# Patient Record
Sex: FEMALE | Race: WHITE | NOT HISPANIC OR LATINO | Employment: UNEMPLOYED | URBAN - METROPOLITAN AREA
[De-identification: names, ages, dates, MRNs, and addresses within clinical notes are randomized per-mention and may not be internally consistent; named-entity substitution may affect disease eponyms.]

---

## 2017-01-02 ENCOUNTER — THERAPY VISIT (OUTPATIENT)
Dept: PHYSICAL THERAPY | Facility: CLINIC | Age: 37
End: 2017-01-02
Payer: COMMERCIAL

## 2017-01-02 DIAGNOSIS — M25.561 ACUTE PAIN OF BOTH KNEES: Primary | ICD-10-CM

## 2017-01-02 DIAGNOSIS — M25.562 ACUTE PAIN OF BOTH KNEES: Primary | ICD-10-CM

## 2017-01-02 PROCEDURE — 97161 PT EVAL LOW COMPLEX 20 MIN: CPT | Mod: GP | Performed by: PHYSICAL THERAPIST

## 2017-01-02 PROCEDURE — 97110 THERAPEUTIC EXERCISES: CPT | Mod: GP | Performed by: PHYSICAL THERAPIST

## 2017-01-02 ASSESSMENT — ACTIVITIES OF DAILY LIVING (ADL)
KNEEL ON THE FRONT OF YOUR KNEE: ACTIVITY IS FAIRLY DIFFICULT
STAND: ACTIVITY IS NOT DIFFICULT
SIT WITH YOUR KNEE BENT: ACTIVITY IS VERY DIFFICULT
WALK: ACTIVITY IS NOT DIFFICULT
AS_A_RESULT_OF_YOUR_KNEE_INJURY,_HOW_WOULD_YOU_RATE_YOUR_CURRENT_LEVEL_OF_DAILY_ACTIVITY?: ABNORMAL
WEAKNESS: I DO NOT HAVE THE SYMPTOM
RAW_SCORE: 40
HOW_WOULD_YOU_RATE_THE_OVERALL_FUNCTION_OF_YOUR_KNEE_DURING_YOUR_USUAL_DAILY_ACTIVITIES?: ABNORMAL
LIMPING: THE SYMPTOM AFFECTS MY ACTIVITY SLIGHTLY
STIFFNESS: THE SYMPTOM AFFECTS MY ACTIVITY MODERATELY
KNEE_ACTIVITY_OF_DAILY_LIVING_SUM: 40
SQUAT: ACTIVITY IS FAIRLY DIFFICULT
GO DOWN STAIRS: ACTIVITY IS FAIRLY DIFFICULT
GIVING WAY, BUCKLING OR SHIFTING OF KNEE: THE SYMPTOM AFFECTS MY ACTIVITY SLIGHTLY
KNEE_ACTIVITY_OF_DAILY_LIVING_SCORE: 57.14
SWELLING: THE SYMPTOM AFFECTS MY ACTIVITY SLIGHTLY
PAIN: THE SYMPTOM AFFECTS MY ACTIVITY MODERATELY
HOW_WOULD_YOU_RATE_THE_CURRENT_FUNCTION_OF_YOUR_KNEE_DURING_YOUR_USUAL_DAILY_ACTIVITIES_ON_A_SCALE_FROM_0_TO_100_WITH_100_BEING_YOUR_LEVEL_OF_KNEE_FUNCTION_PRIOR_TO_YOUR_INJURY_AND_0_BEING_THE_INABILITY_TO_PERFORM_ANY_OF_YOUR_USUAL_DAILY_ACTIVITIES?: 50
GO UP STAIRS: ACTIVITY IS FAIRLY DIFFICULT
RISE FROM A CHAIR: ACTIVITY IS SOMEWHAT DIFFICULT

## 2017-01-02 NOTE — PROGRESS NOTES
[facility] Initial Evaluation    Subjective:    Jessica Dunne is a 36 year old female with a bilateral knees condition.  Condition occurred with:  Repetition/overuse.  Condition occurred: at home.  This is a new condition  Patient notes B anterior knee pain that came on with overuse, attempting to work out with squats/lunges/step cardio, after having had some increased knee pain while pregnant and with attempts to work out after.  Had her daughter on 8/1/16 and has noted pain since around that time. .    Site of Pain: anterior suprapatellar and medial/lateral patellar borders.    Pain is described as aching and is intermittent and reported as 7/10.  Associated with: crepitus, swelling suprapatellar area. Worse during: worse with certain activities, rather than dependent on time of day.  Exacerbated by: squatting, stairs, arising from sitting, sitting cross legged. and relieved by ice and bracing/immobilizing.  Since onset symptoms are gradually worsening.  Special testing: none.  Previous treatment: none for this.    General health as reported by patient is good.  Pertinent medical history includes:  Thyroid problems (previous history of LBP).  Medical allergies: yes (amoxycillin, doxycycline).  Surgical history: thyroid and appendix.  Current medications:  Thyroid medication (prenatal vitamins).  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        Barriers: childcare tasks.    Red flags:  None as reported by the patient.                      Objective:  KNEE:    PROM:   L  R   Hyperextension 3    Extension 0 0   Flexion 149 147     Strength:   L R   HIP     Flex 5 5   Ext 4+ 4+   Abd 4 5   KNEE     Flex 5 5   Ext 5 pain subpatellar 5 pain subpatellar     Special tests:   L R   Anterior Drawer negative negative   Posterior Drawer negative negative   Lachman's negative negative   Valgus 0 degrees negative negative   Valgus 30 degrees negative negative    Varus 0 degrees negative negative   Varus 30 degrees negative negative   Mirian's negative negative   Appley's negative negative   Lateral Compression     Patellar Compression         Palpation: note crepitus with long sitting knee extension B knees, some hypermobility B knees    Patellar tracking: note mild lateral glide and tilt B patella    Functional: double leg squat form is good.  Step down is with hip drop and mild knee valgus on B sides.     Gait: mildly antalgic  Did trial step down (2/10 pre) after Arredondo tape applied (R) for lateral tilt/glide, notes some pain reduction but tape tight so difficulty bending knee during step down, and dropped down last couple of inches, will wear over next few days to test over time    System    Physical Exam    General     ROS    Assessment/Plan:      Patient is a 36 year old female with both sides knee complaints.    Patient has the following significant findings with corresponding treatment plan.                Diagnosis 1:  B patellofemoral pain    Pain -  hot/cold therapy  Decreased ROM/flexibility - manual therapy and therapeutic exercise  Decreased strength - therapeutic exercise and therapeutic activities  Impaired balance - neuro re-education and therapeutic activities  Decreased proprioception - neuro re-education and therapeutic activities  Edema - cold therapy  Impaired gait - gait training  Decreased function - therapeutic activities    Therapy Evaluation Codes:   1) History comprised of:   Personal factors that impact the plan of care:      None.    Comorbidity factors that impact the plan of care are:      thyroid problems.     Medications impacting care: thyroid.  2) Examination of Body Systems comprised of:   Body structures and functions that impact the plan of care:      Lumbar spine.   Activity limitations that impact the plan of care are:      Running, Sitting, Sports, Squatting/kneeling and stairs.   Clinical presentation characteristics  are:    Evolving/Changing.  3) Presentation comprised of:   Presentation scored as Low complexity with uncomplicated characteristics..  4) Decision-Making    Low complexity using standardized patient assessment instrument and/or measureable assessment of functional outcome.  Cumulative Therapy Evaluation is: Low complexity.    Previous and current functional limitations:  (See Goal Flow Sheet for this information)    Short term and Long term goals: (See Goal Flow Sheet for this information)     Communication ability:  Patient appears to be able to clearly communicate and understand verbal and written communication and follow directions correctly.  Treatment Explanation - The following has been discussed with the patient:   RX ordered/plan of care  Anticipated outcomes  Possible risks and side effects  This patient would benefit from PT intervention to resume normal activities.   Rehab potential is good.    Frequency:  1 X week, once daily  Duration:  for 6 weeks  Discharge Plan:  Achieve all LTG.  Independent in home treatment program.  Reach maximal therapeutic benefit.    Please refer to the daily flowsheet for treatment today, total treatment time and time spent performing 1:1 timed codes.

## 2017-01-02 NOTE — Clinical Note
[facility] PHYSICAL THERAPY  [address]  [place] [address]  [phone]    January 3, 2017    Re: Jessica Dunne   :   1980  MRN:  [id number]   REFERRING PHYSICIAN:   Adrien Peralta    [facility] PHYSICAL [facility]    Date of Initial Evaluation: 2016  Visits:  Rxs Used: 1  Reason for Referral:  Acute pain of both knees    EVALUATION SUMMARY    Subjective:  Jessica Dunne is a 36 year old female with a bilateral knees condition.  Condition occurred with:  Repetition/overuse.  Condition occurred: at home.  This is a new condition  Patient notes B anterior knee pain that came on with overuse, attempting to work out with squats/lunges/step cardio, after having had some increased knee pain while pregnant and with attempts to work out after.  Had her daughter on 16 and has noted pain since around that time.   Site of Pain: anterior suprapatellar and medial/lateral patellar borders.    Pain is described as aching and is intermittent and reported as 7/10.  Associated with: crepitus, swelling suprapatellar area. Worse during: worse with certain activities, rather than dependent on time of day.  Exacerbated by: squatting, stairs, arising from sitting, sitting cross legged. and relieved by ice and bracing/immobilizing.  Since onset symptoms are gradually worsening.  Special testing: none.  Previous treatment: none for this.    General health as reported by patient is good.  Pertinent medical history includes:  Thyroid problems (previous history of LBP).  Medical allergies: yes (amoxycillin, doxycycline).  Surgical history: thyroid and appendix.  Current medications:  Thyroid medication (prenatal vitamins).  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      Barriers: childcare tasks.  Red flags:  None as reported by the patient.                   Objective:    KNEE:  PROM:   L  R   Hyperextension 3    Extension 0 0   Flexion 149 147     Re: Jessica Dunne   :   1980    Strength:   L R   HIP     Flex 5 5   Ext 4+ 4+   Abd 4 5   KNEE     Flex 5 5   Ext 5 pain subpatellar 5 pain subpatellar     Special tests:   L R   Anterior Drawer negative negative   Posterior Drawer negative negative   Lachman's negative negative   Valgus 0 degrees negative negative   Valgus 30 degrees negative negative   Varus 0 degrees negative negative   Varus 30 degrees negative negative   Mriian's negative negative   Appley's negative negative   Lateral Compression     Patellar Compression       Palpation: note crepitus with long sitting knee extension B knees, some hypermobility B knees  Patellar tracking: note mild lateral glide and tilt B patella  Functional: double leg squat form is good.  Step down is with hip drop and mild knee valgus on B sides.   Gait: mildly antalgic  Did trial step down (2/10 pre) after Arrdeondo tape applied (R) for lateral tilt/glide, notes some pain reduction but tape tight so difficulty bending knee during step down, and dropped down last couple of inches, will wear over next few days to test over time    Assessment/Plan:    Patient is a 36 year old female with both sides knee complaints.    Patient has the following significant findings with corresponding treatment plan.                Diagnosis 1:  B patellofemoral pain    Pain -  hot/cold therapy  Decreased ROM/flexibility - manual therapy and therapeutic exercise  Decreased strength - therapeutic exercise and therapeutic activities  Impaired balance - neuro re-education and therapeutic activities  Decreased proprioception - neuro re-education and therapeutic activities  Edema - cold therapy  Impaired gait - gait training  Decreased function - therapeutic activities    Re: Jessica Dunne   :   1980    Therapy Evaluation Codes:   1) History comprised of:   Personal factors that impact  the plan of care:      None.    Comorbidity factors that impact the plan of care are:      thyroid problems.     Medications impacting care: thyroid.  2) Examination of Body Systems comprised of:   Body structures and functions that impact the plan of care:      Lumbar spine.   Activity limitations that impact the plan of care are:      Running, Sitting, Sports, Squatting/kneeling and stairs.   Clinical presentation characteristics are:    Evolving/Changing.  3) Presentation comprised of:   Presentation scored as Low complexity with uncomplicated characteristics..  4) Decision-Making    Low complexity using standardized patient assessment instrument and/or measureable assessment of functional outcome.  Cumulative Therapy Evaluation is: Low complexity.    Previous and current functional limitations:  (See Goal Flow Sheet for this information)    Short term and Long term goals: (See Goal Flow Sheet for this information)     Communication ability:  Patient appears to be able to clearly communicate and understand verbal and written communication and follow directions correctly.  Treatment Explanation - The following has been discussed with the patient:   RX ordered/plan of care  Anticipated outcomes  Possible risks and side effects  This patient would benefit from PT intervention to resume normal activities.   Rehab potential is good.    Frequency:  1 X week, once daily  Duration:  for 6 weeks  Discharge Plan:  Achieve all LTG.  Independent in home treatment program.  Reach maximal therapeutic benefit.     Thank you for your referral.    INQUIRIES  Therapist: Christiano Knowles DPT  [facility]  [address] [address]  Phone: [phone]  Fax: [phone]

## 2017-01-10 ENCOUNTER — THERAPY VISIT (OUTPATIENT)
Dept: PHYSICAL THERAPY | Facility: CLINIC | Age: 37
End: 2017-01-10
Payer: COMMERCIAL

## 2017-01-10 DIAGNOSIS — M25.562 ACUTE PAIN OF BOTH KNEES: Primary | ICD-10-CM

## 2017-01-10 DIAGNOSIS — M25.561 ACUTE PAIN OF BOTH KNEES: Primary | ICD-10-CM

## 2017-01-10 PROCEDURE — 97110 THERAPEUTIC EXERCISES: CPT | Mod: GP | Performed by: PHYSICAL THERAPIST

## 2017-01-10 PROCEDURE — 97112 NEUROMUSCULAR REEDUCATION: CPT | Mod: GP | Performed by: PHYSICAL THERAPIST

## 2017-01-17 ENCOUNTER — THERAPY VISIT (OUTPATIENT)
Dept: PHYSICAL THERAPY | Facility: CLINIC | Age: 37
End: 2017-01-17
Payer: COMMERCIAL

## 2017-01-17 DIAGNOSIS — M25.561 ACUTE PAIN OF BOTH KNEES: Primary | ICD-10-CM

## 2017-01-17 DIAGNOSIS — M25.562 ACUTE PAIN OF BOTH KNEES: Primary | ICD-10-CM

## 2017-01-17 PROCEDURE — 97110 THERAPEUTIC EXERCISES: CPT | Mod: GP | Performed by: PHYSICAL THERAPIST

## 2017-01-17 PROCEDURE — 97112 NEUROMUSCULAR REEDUCATION: CPT | Mod: GP | Performed by: PHYSICAL THERAPIST

## 2017-01-24 ENCOUNTER — THERAPY VISIT (OUTPATIENT)
Dept: PHYSICAL THERAPY | Facility: CLINIC | Age: 37
End: 2017-01-24
Payer: COMMERCIAL

## 2017-01-24 DIAGNOSIS — M25.562 ACUTE PAIN OF BOTH KNEES: Primary | ICD-10-CM

## 2017-01-24 DIAGNOSIS — M25.561 ACUTE PAIN OF BOTH KNEES: Primary | ICD-10-CM

## 2017-01-24 PROCEDURE — 97110 THERAPEUTIC EXERCISES: CPT | Mod: GP | Performed by: PHYSICAL THERAPIST

## 2017-01-24 PROCEDURE — 97112 NEUROMUSCULAR REEDUCATION: CPT | Mod: GP | Performed by: PHYSICAL THERAPIST

## 2017-02-07 ENCOUNTER — THERAPY VISIT (OUTPATIENT)
Dept: PHYSICAL THERAPY | Facility: CLINIC | Age: 37
End: 2017-02-07
Payer: COMMERCIAL

## 2017-02-07 DIAGNOSIS — M25.561 ACUTE PAIN OF BOTH KNEES: Primary | ICD-10-CM

## 2017-02-07 DIAGNOSIS — M25.562 ACUTE PAIN OF BOTH KNEES: Primary | ICD-10-CM

## 2017-02-07 PROCEDURE — 97110 THERAPEUTIC EXERCISES: CPT | Mod: GP | Performed by: PHYSICAL THERAPIST

## 2017-02-07 PROCEDURE — 97112 NEUROMUSCULAR REEDUCATION: CPT | Mod: GP | Performed by: PHYSICAL THERAPIST

## 2017-02-21 ENCOUNTER — THERAPY VISIT (OUTPATIENT)
Dept: PHYSICAL THERAPY | Facility: CLINIC | Age: 37
End: 2017-02-21
Payer: COMMERCIAL

## 2017-02-21 DIAGNOSIS — M25.561 ACUTE PAIN OF BOTH KNEES: ICD-10-CM

## 2017-02-21 DIAGNOSIS — M25.562 ACUTE PAIN OF BOTH KNEES: ICD-10-CM

## 2017-02-21 PROCEDURE — 97112 NEUROMUSCULAR REEDUCATION: CPT | Mod: GP | Performed by: PHYSICAL THERAPIST

## 2017-02-21 PROCEDURE — 97110 THERAPEUTIC EXERCISES: CPT | Mod: GP | Performed by: PHYSICAL THERAPIST

## 2017-03-07 ENCOUNTER — THERAPY VISIT (OUTPATIENT)
Dept: PHYSICAL THERAPY | Facility: CLINIC | Age: 37
End: 2017-03-07
Payer: COMMERCIAL

## 2017-03-07 DIAGNOSIS — M25.561 ACUTE PAIN OF BOTH KNEES: ICD-10-CM

## 2017-03-07 DIAGNOSIS — M25.562 ACUTE PAIN OF BOTH KNEES: ICD-10-CM

## 2017-03-07 PROCEDURE — 97110 THERAPEUTIC EXERCISES: CPT | Mod: GP | Performed by: PHYSICAL THERAPIST

## 2017-03-07 PROCEDURE — 97112 NEUROMUSCULAR REEDUCATION: CPT | Mod: GP | Performed by: PHYSICAL THERAPIST

## 2017-03-07 ASSESSMENT — ACTIVITIES OF DAILY LIVING (ADL)
RISE FROM A CHAIR: ACTIVITY IS MINIMALLY DIFFICULT
KNEEL ON THE FRONT OF YOUR KNEE: ACTIVITY IS SOMEWHAT DIFFICULT
WALK: ACTIVITY IS NOT DIFFICULT
STIFFNESS: I DO NOT HAVE THE SYMPTOM
AS_A_RESULT_OF_YOUR_KNEE_INJURY,_HOW_WOULD_YOU_RATE_YOUR_CURRENT_LEVEL_OF_DAILY_ACTIVITY?: NEARLY NORMAL
HOW_WOULD_YOU_RATE_THE_OVERALL_FUNCTION_OF_YOUR_KNEE_DURING_YOUR_USUAL_DAILY_ACTIVITIES?: NEARLY NORMAL
GO UP STAIRS: ACTIVITY IS MINIMALLY DIFFICULT
PAIN: THE SYMPTOM AFFECTS MY ACTIVITY SLIGHTLY
GIVING WAY, BUCKLING OR SHIFTING OF KNEE: I DO NOT HAVE THE SYMPTOM
GO DOWN STAIRS: ACTIVITY IS MINIMALLY DIFFICULT
SIT WITH YOUR KNEE BENT: ACTIVITY IS MINIMALLY DIFFICULT
KNEE_ACTIVITY_OF_DAILY_LIVING_SUM: 60
RAW_SCORE: 60
LIMPING: I HAVE THE SYMPTOM BUT IT DOES NOT AFFECT MY ACTIVITY
HOW_WOULD_YOU_RATE_THE_CURRENT_FUNCTION_OF_YOUR_KNEE_DURING_YOUR_USUAL_DAILY_ACTIVITIES_ON_A_SCALE_FROM_0_TO_100_WITH_100_BEING_YOUR_LEVEL_OF_KNEE_FUNCTION_PRIOR_TO_YOUR_INJURY_AND_0_BEING_THE_INABILITY_TO_PERFORM_ANY_OF_YOUR_USUAL_DAILY_ACTIVITIES?: 85
WEAKNESS: I DO NOT HAVE THE SYMPTOM
SQUAT: ACTIVITY IS MINIMALLY DIFFICULT
STAND: ACTIVITY IS NOT DIFFICULT
SWELLING: I DO NOT HAVE THE SYMPTOM
KNEE_ACTIVITY_OF_DAILY_LIVING_SCORE: 85.71

## 2017-03-07 NOTE — MR AVS SNAPSHOT
"              After Visit Summary   3/7/2017    Jessica Dunne    MRN: [id number]           Patient Information     Date Of Birth          1980        Visit Information        Provider Department      3/7/2017 6:20 PM Josiah Knowles PT [facility]        Today's Diagnoses     Acute pain of both knees           Follow-ups after your visit        Who to contact     If you have questions or need follow up information about today's clinic visit or your schedule please contact [facility] PHYSICAL [facility] directly at [phone].  Normal or non-critical lab and imaging results will be communicated to you by Xplornethart, letter or phone within 4 business days after the clinic has received the results. If you do not hear from us within 7 days, please contact the clinic through Xplornethart or phone. If you have a critical or abnormal lab result, we will notify you by phone as soon as possible.  Submit refill requests through GetTaxi or call your pharmacy and they will forward the refill request to us. Please allow 3 business days for your refill to be completed.          Additional Information About Your Visit        MyChart Information     GetTaxi lets you send messages to your doctor, view your test results, renew your prescriptions, schedule appointments and more. To sign up, go to [email or web address] . Click on \"Log in\" on the left side of the screen, which will take you to the Welcome page. Then click on \"Sign up Now\" on the right side of the page.     You will be asked to enter the access code listed below, as well as some personal information. Please follow the directions to create your username and password.     Your access code is: [email or web address]  Expires: 2017  7:21 PM     Your access code will  in 90 days. If you need help or a new code, please call your [place] clinic or [phone].        Care EveryWhere ID     " This is your Care EveryWhere ID. This could be used by other organizations to access your [facility] medical records  [id number]         Blood Pressure from Last 3 Encounters:   No data found for BP    Weight from Last 3 Encounters:   No data found for Wt              We Performed the Following     [facility] PROGRESS NOTES REPORT     NEUROMUSCULAR RE-EDUCATION     THERAPEUTIC EXERCISES        Primary Care Provider    None Specified       No primary provider on file.        Thank you!     Thank you for choosing [facility]ERAP[facility]  for your care. Our goal is always to provide you with excellent care. Hearing back from our patients is one way we can continue to improve our services. Please take a few minutes to complete the written survey that you may receive in the mail after your visit with us. Thank you!             Your Updated Medication List - Protect others around you: Learn how to safely use, store and throw away your medicines at www.[email or web address].org.      Notice  As of 3/7/2017  7:34 PM    You have not been prescribed any medications.

## 2017-03-07 NOTE — LETTER
[facility]LET[facility] PHYSICAL THERAPY  [address]  [place] [address]  [phone]    2017    Re: Jessica Dunne   :   1980  MRN:  [id number]   REFERRING PHYSICIAN:   Adrien Peralta    [facility] PHYSICAL [facility]    Date of Initial Evaluation:  2017  Visits:  Rxs Used: 7  Reason for Referral:  Acute pain of both knees    DISCHARGE REPORT  Progress reporting period is from 17 to 3/7/17 (7 visits).       SUBJECTIVE  Subjective changes noted by patient:  Patient reports that she feels she is improving well now.  She notes that she can now go down stairs without pain most of the time.  She now has just mild/intermittent L lateral ITB type pain and R subpatellar area pain, but notes both are improved.  She has intermittent pain getting up off of the floor (while holding her small child), but this is better with good technique as well.  The L lateral knee/ITB pain was waking her at night 3-4x, but that is down to 1 now.  She also noted some diastisis recti after pregnancy and feels core strength has helped some with that.     Current pain level is: 1/10.     Previous pain level was: 7/10.   Changes in function:  Yes (See Goal flowsheet attached for changes in current functional level)  Adverse reaction to treatment or activity: None    OBJECTIVE  Changes noted in objective findings:  Yes,   Objective: Not painful on stair reciprocation today.  Strength improved and is 5/5 for hip flexion, abduction, knee flexion and extension bilaterally.  Hip extension is 5-/5 on L and 5/5 on R.  Plank time up to 1 min.  Overall, she has made good gains.      ASSESSMENT/PLAN  Updated problem list and treatment plan: Diagnosis 1:  B patellofemoral pain    Pain -  self management  Decreased strength - home program  Decreased proprioception - home program  Decreased function - home program  STG/LTGs have been met or  progress has been made towards goals:  Yes (See Goal flow sheet completed today.)  Assessment of Progress: The patient's condition is improving.  Re: Jessica Dunne   :   1980    Self Management Plans:  Patient has been instructed in a home treatment program.  I have re-evaluated this patient and find that the nature, scope, duration and intensity of the therapy is appropriate for the medical condition of the patient.  Jessica continues to require the following intervention to meet STG and LTG's:  PT intervention is no longer required to meet STG/LTG.    Recommendations:  Will trial self management with HEP at this point.  May call PT if she feels symptoms worsen.  If no follow up in 3-4 weeks, will discharge from PT.    Thank you for your referral.    INQUIRIES  Therapist: Christiano Knowles DPT  [facility] [place] [facility]  [address] [address]  Phone: [phone]  Fax: [phone]

## 2017-03-08 NOTE — PROGRESS NOTES
Subjective:    HPI                    Objective:    System    Physical Exam    General     ROS    Assessment/Plan:      DISCHARGE REPORT    Progress reporting period is from 1/2/17 to 3/7/17 (7 visits).       SUBJECTIVE  Subjective changes noted by patient:  Patient reports that she feels she is improving well now.  She notes that she can now go down stairs without pain most of the time.  She now has just mild/intermittent L lateral ITB type pain and R subpatellar area pain, but notes both are improved.  She has intermittent pain getting up off of the floor (while holding her small child), but this is better with good technique as well.  The L lateral knee/ITB pain was waking her at night 3-4x, but that is down to 1 now.  She also noted some diastisis recti after pregnancy and feels core strength has helped some with that.     Current pain level is: 1/10.     Previous pain level was: 7/10.   Changes in function:  Yes (See Goal flowsheet attached for changes in current functional level)  Adverse reaction to treatment or activity: None    OBJECTIVE  Changes noted in objective findings:  Yes,   Objective: Not painful on stair reciprocation today.  Strength improved and is 5/5 for hip flexion, abduction, knee flexion and extension bilaterally.  Hip extension is 5-/5 on L and 5/5 on R.  Plank time up to 1 min.  Overall, she has made good gains.      ASSESSMENT/PLAN  Updated problem list and treatment plan: Diagnosis 1:  B patellofemoral pain    Pain -  self management  Decreased strength - home program  Decreased proprioception - home program  Decreased function - home program  STG/LTGs have been met or progress has been made towards goals:  Yes (See Goal flow sheet completed today.)  Assessment of Progress: The patient's condition is improving.  Self Management Plans:  Patient has been instructed in a home treatment program.  I have re-evaluated this patient and find that the nature, scope, duration and intensity of the  therapy is appropriate for the medical condition of the patient.  Jessica continues to require the following intervention to meet STG and LTG's:  PT intervention is no longer required to meet STG/LTG.    Recommendations:  Will trial self management with HEP at this point.  May call PT if she feels symptoms worsen.  If no follow up in 3-4 weeks, will discharge from PT.    Please refer to the daily flowsheet for treatment today, total treatment time and time spent performing 1:1 timed codes.

## 2018-12-05 ENCOUNTER — THERAPY VISIT (OUTPATIENT)
Dept: PHYSICAL THERAPY | Facility: CLINIC | Age: 38
End: 2018-12-05
Payer: COMMERCIAL

## 2018-12-05 DIAGNOSIS — M54.50 ACUTE BILATERAL LOW BACK PAIN WITHOUT SCIATICA: Primary | ICD-10-CM

## 2018-12-05 PROCEDURE — 97110 THERAPEUTIC EXERCISES: CPT | Mod: GP | Performed by: PHYSICAL THERAPIST

## 2018-12-05 PROCEDURE — 97530 THERAPEUTIC ACTIVITIES: CPT | Mod: GP | Performed by: PHYSICAL THERAPIST

## 2018-12-05 PROCEDURE — 97161 PT EVAL LOW COMPLEX 20 MIN: CPT | Mod: GP | Performed by: PHYSICAL THERAPIST

## 2018-12-05 NOTE — PROGRESS NOTES
[facility] Initial Evaluation  Subjective:  Patient is a 38 year old female presenting with rehab back hpi. The history is provided by the patient. No  was used.   Jessica Dunne is a 38 year old female with a lumbar condition.  Condition occurred with:  Insidious onset.  Condition occurred: for unknown reasons.  This is a new condition  On 2018, I started to have low back pain right greater than left and right leg pain.  The pain has now gone from right side to the middle and is not shooting down the leg but it is still there in the low back.  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    Patient reports pain:  Lumbar spine left, lumbar spine right and lower lumbar spine (right greater than left).  Radiates to:  No radiation.  Pain is described as sharp and is intermittent and reported as 4/10 and 2/10 (ranges).  Associated with: none. Pain is the same all the time.  Symptoms are exacerbated by lifting, bending and carrying and relieved by heat and ice.  Since onset symptoms are gradually improving.  Special testing: none.  Previous treatment includes physical therapy.  There was significant improvement following previous treatment.  General health as reported by patient is good.  Pertinent medical history includes:  Thyroid problems (diastasis recti).  Medical allergies: yes (amixycillin, doxycycline).  Surgical history: , appendex, thyroid lobectomy.  Current medications:  Thyroid medication.  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  Patient is working in normal job with restrictions.  Primary job tasks include:  Lifting, prolonged sitting and prolonged standing.    Barriers include:  Bathroom/bedroom on second floor (house, basement laundry.).    Red flags:  None as reported by the patient.                        Objective:  Standing Alignment:    Cervical/Thoracic:  Forward head  Shoulder/UE:  Rounded shoulders  Lumbar:  Lordosis decr  Pelvic:  Iliac  crest high R  Hip:  Greater trochanter high R        Gait:      Deviations:  General Deviations:  Stride length decr and caro decr    Flexibility/Screens:   Positive screens:  Lumbar    Lower Extremity:  Decreased left lower extremity flexibility:Hip Flexors; Quadriceps; Hamstrings and Gastroc    Decreased right lower extremity flexibility:  Hip Flexors and Quadriceps  Spine:  Decreased left spine flexibility:  Quadratus Lumborum    Decreased right spine flexibility:  Quadratus Lumborum             Lumbar/SI Evaluation  ROM:    AROM Lumbar:   Flexion:        Ankle with pain on return and increase in pain   Ext:                    Moderate with reps decrease in pain    Side Bend:        Left:     Right:   Rotation:           Left:     Right:   Side Glide:        Left:     Right:         Strength: TA 1+/5, decrease in hip extension,   Lumbar Myotomes:    T12-L3 (Hip Flex):  Left: 5    Right: 5  L2-4 (Quads):  Left:  5    Right:  5  L4 (Ankle DF):  Left:  5    Right:  5  L5 (Great Toe Ext): Left: 5    Right: 5   S1 (Toe Raise):  Left: 5    Right: 5        Neural Tension/Mobility:      Left side:Slump  negative.   Right side:   Slump positive.  Lumbar Palpation:    Tenderness present at Left:    Quadratus Lumborum; Erector Spinae and Vertebral  Tenderness present at Right: Quadratus Lumborum and Vertebral    Lumbar Provocation:    Left positive with:  Mobility  Right positive with: Mobility  Spinal Segmental Conclusions:     Level: Hypo noted at T11, T12, L1 and L5                                                   General     ROS    Assessment/Plan:    Patient is a 38 year old female with lumbar complaints.    Patient has the following significant findings with corresponding treatment plan.                Diagnosis 1:  Low  Back pain   Pain -  manual therapy, self management, education, directional preference exercise and home program  Decreased ROM/flexibility - manual therapy, therapeutic exercise, therapeutic  activity and home program  Decreased joint mobility - manual therapy, therapeutic exercise, therapeutic activity and home program  Impaired gait - gait training and home program  Impaired muscle performance - neuro re-education and home program  Decreased function - therapeutic activities and home program    Therapy Evaluation Codes:   1) History comprised of:   Personal factors that impact the plan of care:      Past/current experiences and stay at home mom.    Comorbidity factors that impact the plan of care are:      .     Medications impacting care: None.  2) Examination of Body Systems comprised of:   Body structures and functions that impact the plan of care:      Lumbar spine.   Activity limitations that impact the plan of care are:      Bathing, Bending, Cooking, Driving, Dressing, Lifting, Sitting, Squatting/kneeling, Stairs, Working, Sleeping and  duties.  3) Clinical presentation characteristics are:   Stable/Uncomplicated.  4) Decision-Making    Low complexity using standardized patient assessment instrument and/or measureable assessment of functional outcome.  Cumulative Therapy Evaluation is: Low complexity.    Previous and current functional limitations:  (See Goal Flow Sheet for this information)    Short term and Long term goals: (See Goal Flow Sheet for this information)     Communication ability:  Patient appears to be able to clearly communicate and understand verbal and written communication and follow directions correctly.  Treatment Explanation - The following has been discussed with the patient:   RX ordered/plan of care  Possible risks and side effects  This patient would benefit from PT intervention to resume normal activities.   Rehab potential is good.    Frequency:  2 X week, once daily  Duration:  for 1 weeks tapering to 1 X a week over 4 weeks  Discharge Plan:  Achieve all LTG.  Independent in home treatment program.    Please refer to the daily flowsheet for  treatment today, total treatment time and time spent performing 1:1 timed codes.

## 2018-12-05 NOTE — MR AVS SNAPSHOT
After Visit Summary   12/5/2018    Jessica Dunne    MRN: [id number]           Patient Information     Date Of Birth          1980        Visit Information        Provider Department      12/5/2018 4:50 PM Akanksha Millan, PT [facility] Physical [facility]        Today's Diagnoses     Acute bilateral low back pain without sciatica    -  1       Follow-ups after your visit        Your next 10 appointments already scheduled     Dec 10, 2018  6:10 PM CST   JAYSHREE Spine with Leah Ansari PT   [facility] ([facility])    [address] [place] [address] [address] [place] [id number]   [phone]            Dec 13, 2018  5:30 PM CST   JAYSHREE Spine with Akanksha Millan PT   [facility] ([facility])    [address] [place] [id number]   [phone]            Dec 17, 2018  6:10 PM CST   JAYSHREE Spine with Leah Ansari PT   [facility] ([facility])    [address] [place] [address]   [phone]            Dec 24, 2018  9:00 AM CST   JAYSHREE Spine with Akanksha Millan PT   [facility] ([facility])    [address] [place] [address]   [phone]              Who to contact     If you have questions or need follow up information about today's clinic visit or your schedule please contact [facility]ERAP[facility] directly at [phone].  Normal or non-critical lab and imaging results will be communicated to you by MyChart, letter or phone within 4 business days after the clinic has received the results. If you do not hear from us within 7 days, please contact the clinic through MyChart or phone. If you  "have a critical or abnormal lab result, we will notify you by phone as soon as possible.  Submit refill requests through [employer] or call your pharmacy and they will forward the refill request to us. Please allow 3 business days for your refill to be completed.          Additional Information About Your Visit        [employer] Information     [employer] lets you send messages to your doctor, view your test results, renew your prescriptions, schedule appointments and more. To sign up, go to [email or web address] . Click on \"Log in\" on the left side of the screen, which will take you to the Welcome page. Then click on \"Sign up Now\" on the right side of the page.     You will be asked to enter the access code listed below, as well as some personal information. Please follow the directions to create your username and password.     Your access code is: [email or web address]  Expires: 3/5/2019  7:43 PM     Your access code will  in 90 days. If you need help or a new code, please call your [place] clinic or [phone].        Care EveryWhere ID     This is your Care EveryWhere ID. This could be used by other organizations to access your [place] medical records  [id number]         Blood Pressure from Last 3 Encounters:   No data found for BP    Weight from Last 3 Encounters:   No data found for Wt              We Performed the Following     HC PT EVAL, LOW COMPLEXITY     JAYSHREE INITIAL EVAL REPORT     THERAPEUTIC ACTIVITIES     THERAPEUTIC EXERCISES        Primary Care Provider    None Specified       No primary provider on file.        Equal Access to Services     [facility]: Hadii adan Coley, waaxda luelizabethadaha, qaybta kaalmada leonid, dejah childs . So [facility] [phone].    ATENCIÓN: Si habla español, tiene a kramer disposición servicios gratuitos de asistencia lingüística. Llame al [phone].    We comply with applicable federal civil rights laws and [place] laws. We do not " discriminate on the basis of race, color, national origin, age, disability, sex, sexual orientation, or gender identity.            Thank you!     Thank you for choosing [facility]ERAP[facility]  for your care. Our goal is always to provide you with excellent care. Hearing back from our patients is one way we can continue to improve our services. Please take a few minutes to complete the written survey that you may receive in the mail after your visit with us. Thank you!             Your Updated Medication List - Protect others around you: Learn how to safely use, store and throw away your medicines at www.disposemymeds.org.      Notice  As of 12/5/2018  7:43 PM    You have not been prescribed any medications.

## 2018-12-05 NOTE — LETTER
[facility]  [address]  [facility] [address]  [phone]    2018    Re: Jessica Dunne   :   1980  MRN:  [id number]   REFERRING PHYSICIAN:   Referred Self    [facility] PHYSICAL [facility]    Date of Initial Evaluation:  2018  Visits:  Rxs Used: 1  Reason for Referral:  Acute bilateral low back pain without sciatica    EVALUATION SUMMARY    Subjective:  Patient is a 38 year old female presenting with rehab back hpi. The history is provided by the patient. No  was used.   Jessica Dunne is a 38 year old female with a lumbar condition.  Condition occurred with:  Insidious onset.  Condition occurred: for unknown reasons.  This is a new condition  On 2018, I started to have low back pain right greater than left and right leg pain.  The pain has now gone from right side to the middle and is not shooting down the leg but it is still there in the low back.  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    Patient reports pain:  Lumbar spine left, lumbar spine right and lower lumbar spine (right greater than left).  Radiates to:  No radiation.  Pain is described as sharp and is intermittent and reported as 4/10 and 2/10 (ranges).  Associated with: none. Pain is the same all the time.  Symptoms are exacerbated by lifting, bending and carrying and relieved by heat and ice.  Since onset symptoms are gradually improving.  Special testing: none.  Previous treatment includes physical therapy.  There was significant improvement following previous treatment.  General health as reported by patient is good.  Pertinent medical history includes:  Thyroid problems (diastasis recti).  Medical allergies: yes (amixycillin, doxycycline).  Surgical history: , appendex, thyroid lobectomy.  Current medications:  Thyroid medication.  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  Patient is working in normal job with restrictions.  Primary job tasks include:  Lifting, prolonged sitting and prolonged standing.  Barriers include:  Bathroom/bedroom on second floor (house, basement laundry.).  Red flags:  None as reported by the patient.                  Objective:  Standing Alignment:    Cervical/Thoracic:  Forward head  Shoulder/UE:  Rounded shoulders  Lumbar:  Lordosis decr  Pelvic:  Iliac crest high R  Hip:  Greater trochanter high R  Gait:    Deviations:  General Deviations:  Stride length decr and caro decr  Flexibility/Screens:   Re: Jessica Dunne   :   1980    Positive screens:  Lumbar  Lower Extremity:  Decreased left lower extremity flexibility:Hip Flexors; Quadriceps; Hamstrings and Gastroc  Decreased right lower extremity flexibility:  Hip Flexors and Quadriceps  Spine:  Decreased left spine flexibility:  Quadratus Lumborum  Decreased right spine flexibility:  Quadratus Lumborum       Lumbar/SI Evaluation  ROM:    AROM Lumbar:   Flexion:        Ankle with pain on return and increase in pain   Ext:                    Moderate with reps decrease in pain    Side Bend:        Left:     Right:   Rotation:           Left:     Right:   Side Glide:        Left:     Right:   Strength: TA 1+/5, decrease in hip extension,   Lumbar Myotomes:    T12-L3 (Hip Flex):  Left: 5    Right: 5  L2-4 (Quads):  Left:  5    Right:  5  L4 (Ankle DF):  Left:  5    Right:  5  L5 (Great Toe Ext): Left: 5    Right: 5   S1 (Toe Raise):  Left: 5    Right: 5  Neural Tension/Mobility:    Left side:Slump  negative.   Right side:   Slump positive.  Lumbar Palpation:    Tenderness present at Left:    Quadratus Lumborum; Erector Spinae and Vertebral  Tenderness present at Right: Quadratus Lumborum and Vertebral  Lumbar Provocation:    Left positive with:  Mobility  Right positive with: Mobility  Spinal Segmental Conclusions:   Level: Hypo noted at T11, T12, L1 and L5    Assessment/Plan:    Patient is  a 38 year old female with lumbar complaints.    Patient has the following significant findings with corresponding treatment plan.                Diagnosis 1:  Low  Back pain   Pain -  manual therapy, self management, education, directional preference exercise and home program  Decreased ROM/flexibility - manual therapy, therapeutic exercise, therapeutic activity and home program  Decreased joint mobility - manual therapy, therapeutic exercise, therapeutic activity and home program  Impaired gait - gait training and home program  Impaired muscle performance - neuro re-education and home program  Decreased function - therapeutic activities and home program    Therapy Evaluation Codes:   1) History comprised of:  Re: Jessica Dunne   :   1980     Personal factors that impact the plan of care:      Past/current experiences and stay at 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    Comorbidity factors that impact the plan of care are:      .     Medications impacting care: None.  2) Examination of Body Systems comprised of:   Body structures and functions that impact the plan of care:      Lumbar spine.   Activity limitations that impact the plan of care are:      Bathing, Bending, Cooking, Driving, Dressing, Lifting, Sitting, Squatting/kneeling, Stairs, Working, Sleeping and  duties.  3) Clinical presentation characteristics are:   Stable/Uncomplicated.  4) Decision-Making    Low complexity using standardized patient assessment instrument and/or measureable assessment of functional outcome.  Cumulative Therapy Evaluation is: Low complexity.    Previous and current functional limitations:  (See Goal Flow Sheet for this information)    Short term and Long term goals: (See Goal Flow Sheet for this information)     Communication ability:  Patient appears to be able to clearly communicate and understand verbal and written communication and follow directions correctly.  Treatment Explanation - The following has been discussed with the  patient:   RX ordered/plan of care  Possible risks and side effects  This patient would benefit from PT intervention to resume normal activities.   Rehab potential is good.    Frequency:  2 X week, once daily  Duration:  for 1 weeks tapering to 1 X a week over 4 weeks  Discharge Plan:  Achieve all LTG.  Independent in home treatment program.    Thank you for your referral.    INQUIRIES  Therapist: Akanksha Millan, PT  [facility]  [address] [address]  Phone: [phone]  Fax: [phone]

## 2018-12-13 ENCOUNTER — THERAPY VISIT (OUTPATIENT)
Dept: PHYSICAL THERAPY | Facility: CLINIC | Age: 38
End: 2018-12-13
Payer: COMMERCIAL

## 2018-12-13 DIAGNOSIS — M54.50 ACUTE BILATERAL LOW BACK PAIN WITHOUT SCIATICA: ICD-10-CM

## 2018-12-13 PROCEDURE — 97110 THERAPEUTIC EXERCISES: CPT | Mod: GP | Performed by: PHYSICAL THERAPIST

## 2018-12-13 PROCEDURE — 97112 NEUROMUSCULAR REEDUCATION: CPT | Mod: GP | Performed by: PHYSICAL THERAPIST

## 2018-12-17 ENCOUNTER — THERAPY VISIT (OUTPATIENT)
Dept: PHYSICAL THERAPY | Facility: CLINIC | Age: 38
End: 2018-12-17
Payer: COMMERCIAL

## 2018-12-17 DIAGNOSIS — M54.50 ACUTE BILATERAL LOW BACK PAIN WITHOUT SCIATICA: ICD-10-CM

## 2018-12-17 PROCEDURE — 97110 THERAPEUTIC EXERCISES: CPT | Mod: GP | Performed by: PHYSICAL THERAPIST

## 2018-12-17 PROCEDURE — 97112 NEUROMUSCULAR REEDUCATION: CPT | Mod: GP | Performed by: PHYSICAL THERAPIST

## 2018-12-24 ENCOUNTER — THERAPY VISIT (OUTPATIENT)
Dept: PHYSICAL THERAPY | Facility: CLINIC | Age: 38
End: 2018-12-24
Payer: COMMERCIAL

## 2018-12-24 DIAGNOSIS — M54.50 ACUTE BILATERAL LOW BACK PAIN WITHOUT SCIATICA: ICD-10-CM

## 2018-12-24 PROCEDURE — 97110 THERAPEUTIC EXERCISES: CPT | Mod: GP | Performed by: PHYSICAL THERAPIST

## 2018-12-24 PROCEDURE — 97530 THERAPEUTIC ACTIVITIES: CPT | Mod: GP | Performed by: PHYSICAL THERAPIST

## 2018-12-24 PROCEDURE — 97112 NEUROMUSCULAR REEDUCATION: CPT | Mod: GP | Performed by: PHYSICAL THERAPIST

## 2019-03-25 PROBLEM — M54.50 ACUTE BILATERAL LOW BACK PAIN WITHOUT SCIATICA: Status: RESOLVED | Noted: 2018-12-05 | Resolved: 2019-03-25

## 2019-03-25 NOTE — PROGRESS NOTES
Discharge Note    Progress reporting period is from initial evaluation date (please see noted date below) to Dec 24, 2018.  Linked Episodes   Type: Episode: Status: Noted: Resolved: Last update: Updated by:   PHYSICAL THERAPY low back pain 12/5/2018 Active 12/5/2018 12/24/2018  9:11 AM Akanksha Millan PT      Comments:       Jessica failed to follow up and current status is unknown.  Please see information below for last relevant information on current status.  Patient seen for 4 visits.    SUBJECTIVE  Subjective changes noted by patient:  I am sleeping a little better, able to sleep through the night   .  Current pain level is 0/10.     Previous pain level was   .   Changes in function:  Yes (See Goal flowsheet attached for changes in current functional level)  Adverse reaction to treatment or activity: None    OBJECTIVE  Changes noted in objective findings: TROM flexion ankle, slight pain with return, extension WFL TA 2-/5,  instruction in basic body mechaninics with lifting from chair      ASSESSMENT/PLAN  Diagnosis: low back pain    Updated problem list and treatment plan:   Decreased strength - HEP  Impaired muscle performance - HEP  STG/LTGs have been met or progress has been made towards goals:  Yes, please see goal flowsheet for most current information  Assessment of Progress: current status is unknown.    Last current status:     Self Management Plans:  HEP  I have re-evaluated this patient and find that the nature, scope, duration and intensity of the therapy is appropriate for the medical condition of the patient.  Jessica continues to require the following intervention to meet STG and LTG's:  HEP.    Recommendations:  Discharge with current home program.  Patient to follow up with MD as needed.    Please refer to the daily flowsheet for treatment today, total treatment time and time spent performing 1:1 timed codes.

## 2021-07-20 ENCOUNTER — APPOINTMENT (OUTPATIENT)
Dept: URBAN - METROPOLITAN AREA CLINIC 254 | Age: 41
Setting detail: DERMATOLOGY
End: 2021-07-20

## 2021-07-20 VITALS — HEIGHT: 69 IN | WEIGHT: 143 LBS | RESPIRATION RATE: 16 BRPM

## 2021-07-20 DIAGNOSIS — D22 MELANOCYTIC NEVI: ICD-10-CM

## 2021-07-20 DIAGNOSIS — D36.1 BENIGN NEOPLASM OF PERIPHERAL NERVES AND AUTONOMIC NERVOUS SYSTEM: ICD-10-CM

## 2021-07-20 DIAGNOSIS — D18.0 HEMANGIOMA: ICD-10-CM

## 2021-07-20 DIAGNOSIS — L81.4 OTHER MELANIN HYPERPIGMENTATION: ICD-10-CM

## 2021-07-20 DIAGNOSIS — Z71.89 OTHER SPECIFIED COUNSELING: ICD-10-CM

## 2021-07-20 DIAGNOSIS — L82.1 OTHER SEBORRHEIC KERATOSIS: ICD-10-CM

## 2021-07-20 DIAGNOSIS — L73.8 OTHER SPECIFIED FOLLICULAR DISORDERS: ICD-10-CM

## 2021-07-20 PROBLEM — D48.5 NEOPLASM OF UNCERTAIN BEHAVIOR OF SKIN: Status: ACTIVE | Noted: 2021-07-20

## 2021-07-20 PROBLEM — D23.72 OTHER BENIGN NEOPLASM OF SKIN OF LEFT LOWER LIMB, INCLUDING HIP: Status: ACTIVE | Noted: 2021-07-20

## 2021-07-20 PROBLEM — D22.62 MELANOCYTIC NEVI OF LEFT UPPER LIMB, INCLUDING SHOULDER: Status: ACTIVE | Noted: 2021-07-20

## 2021-07-20 PROBLEM — D22.72 MELANOCYTIC NEVI OF LEFT LOWER LIMB, INCLUDING HIP: Status: ACTIVE | Noted: 2021-07-20

## 2021-07-20 PROBLEM — D22.5 MELANOCYTIC NEVI OF TRUNK: Status: ACTIVE | Noted: 2021-07-20

## 2021-07-20 PROBLEM — D18.01 HEMANGIOMA OF SKIN AND SUBCUTANEOUS TISSUE: Status: ACTIVE | Noted: 2021-07-20

## 2021-07-20 PROBLEM — D36.12 BENIGN NEOPLASM OF PERIPHERAL NERVES AND AUTONOMIC NERVOUS SYSTEM, UPPER LIMB, INCLUDING SHOULDER: Status: ACTIVE | Noted: 2021-07-20

## 2021-07-20 PROCEDURE — OTHER MONITORING: OTHER

## 2021-07-20 PROCEDURE — 11301 SHAVE SKIN LESION 0.6-1.0 CM: CPT

## 2021-07-20 PROCEDURE — OTHER PATHOLOGY BILLING: OTHER

## 2021-07-20 PROCEDURE — OTHER MIPS QUALITY: OTHER

## 2021-07-20 PROCEDURE — OTHER SHAVE REMOVAL: OTHER

## 2021-07-20 PROCEDURE — 99203 OFFICE O/P NEW LOW 30 MIN: CPT | Mod: 25

## 2021-07-20 PROCEDURE — OTHER SUNSCREEN RECOMMENDATIONS: OTHER

## 2021-07-20 PROCEDURE — OTHER COUNSELING: OTHER

## 2021-07-20 PROCEDURE — 88305 TISSUE EXAM BY PATHOLOGIST: CPT

## 2021-07-20 ASSESSMENT — LOCATION ZONE DERM
LOCATION ZONE: FEET
LOCATION ZONE: FACE
LOCATION ZONE: ARM
LOCATION ZONE: TRUNK
LOCATION ZONE: LEG

## 2021-07-20 ASSESSMENT — LOCATION SIMPLE DESCRIPTION DERM
LOCATION SIMPLE: LEFT POPLITEAL SKIN
LOCATION SIMPLE: UPPER BACK
LOCATION SIMPLE: RIGHT POSTERIOR UPPER ARM
LOCATION SIMPLE: LEFT SHOULDER
LOCATION SIMPLE: LEFT THIGH
LOCATION SIMPLE: CHEST
LOCATION SIMPLE: RIGHT UPPER BACK
LOCATION SIMPLE: RIGHT SHOULDER
LOCATION SIMPLE: LEFT PLANTAR SURFACE
LOCATION SIMPLE: LEFT TEMPLE
LOCATION SIMPLE: LEFT UPPER BACK
LOCATION SIMPLE: ABDOMEN

## 2021-07-20 ASSESSMENT — LOCATION DETAILED DESCRIPTION DERM
LOCATION DETAILED: RIGHT MEDIAL SUPERIOR CHEST
LOCATION DETAILED: LEFT CENTRAL TEMPLE
LOCATION DETAILED: RIGHT PROXIMAL LATERAL POSTERIOR UPPER ARM
LOCATION DETAILED: RIGHT MID-UPPER BACK
LOCATION DETAILED: RIGHT POSTERIOR SHOULDER
LOCATION DETAILED: RIGHT MEDIAL UPPER BACK
LOCATION DETAILED: SUPERIOR THORACIC SPINE
LOCATION DETAILED: UPPER STERNUM
LOCATION DETAILED: LEFT MEDIAL PLANTAR MIDFOOT
LOCATION DETAILED: LEFT ANTERIOR SHOULDER
LOCATION DETAILED: LEFT POPLITEAL SKIN
LOCATION DETAILED: EPIGASTRIC SKIN
LOCATION DETAILED: LEFT ANTERIOR DISTAL THIGH
LOCATION DETAILED: LEFT MEDIAL UPPER BACK

## 2021-07-20 NOTE — HPI: FULL BODY SKIN EXAMINATION
How Severe Are Your Spot(S)?: mild
What Is The Reason For Today's Visit?: Full Body Skin Examination with No Concerns
What Is The Reason For Today's Visit? (Being Monitored For X): concerning skin lesions on an annual basis
Additional History: Grandfather passed away in 40s of MM. dad with non melanoma.

## 2021-07-20 NOTE — PROCEDURE: SHAVE REMOVAL
Biopsy Method: Dermablade
Billing Type: Third-Party Bill
Render Post-Care Instructions In Note?: no
Notification Instructions: Patient will be notified of pathology results. However, patient instructed to call the office if not contacted within 2 weeks.
Size Of Lesion In Cm (Required): 0.6
Wound Care: Petrolatum
Medical Necessity Clause: This procedure was medically necessary because the lesion that was treated was:
Consent was obtained from the patient. The risks and benefits to therapy were discussed in detail. Specifically, the risks of infection, scarring, bleeding, prolonged wound healing, incomplete removal, allergy to anesthesia, nerve injury and recurrence were addressed. Prior to the procedure, the treatment site was clearly identified and confirmed by the patient. All components of Universal Protocol/PAUSE Rule completed.
Post-Care Instructions: I reviewed with the patient in detail post-care instructions. Patient is to keep the biopsy site dry overnight, and then apply bacitracin twice daily until healed. Patient may apply hydrogen peroxide soaks to remove any crusting.
Anesthesia Type: 1% lidocaine with epinephrine
Detail Level: Detailed
Hemostasis: Drysol
Was A Bandage Applied: Yes
Medical Necessity Information: It is in your best interest to select a reason for this procedure from the list below. All of these items fulfill various CMS LCD requirements except the new and changing color options.
X Size Of Lesion In Cm (Optional): 0

## 2021-07-20 NOTE — PROCEDURE: PATHOLOGY BILLING
Rendering Text In Billing: The slides will be read by [employer] and reported in the attached document. Rendering Text In Billing: The slides will be read by [employer] and reported in the attached document.

## 2021-07-20 NOTE — PROCEDURE: PATHOLOGY BILLING
Immunohistochemistry (17571 and [id number]) billing is not performed here. Please use the Immunohistochemistry Stain Billing plan to accomplish this. Immunohistochemistry (08843 and [id number]) billing is not performed here. Please use the Immunohistochemistry Stain Billing plan to accomplish this.

## 2021-07-21 ENCOUNTER — THERAPY VISIT (OUTPATIENT)
Dept: PHYSICAL THERAPY | Facility: CLINIC | Age: 41
End: 2021-07-21
Payer: COMMERCIAL

## 2021-07-21 DIAGNOSIS — M54.16 LUMBAR RADICULOPATHY: ICD-10-CM

## 2021-07-21 PROCEDURE — 97110 THERAPEUTIC EXERCISES: CPT | Mod: GP | Performed by: PHYSICAL THERAPIST

## 2021-07-21 PROCEDURE — 97140 MANUAL THERAPY 1/> REGIONS: CPT | Mod: GP | Performed by: PHYSICAL THERAPIST

## 2021-07-21 PROCEDURE — 97161 PT EVAL LOW COMPLEX 20 MIN: CPT | Mod: GP | Performed by: PHYSICAL THERAPIST

## 2021-07-21 NOTE — PROGRESS NOTES
Physical Therapy Initial Evaluation  Subjective:    Therapist Generated HPI Evaluation  Problem details: MD order 7/13/21.    Jessica has been having low back pain 6 yrs ago - injuring from gardening. She had to arch backwards to feel better and it got worse. She underwent PT with Christiano Knowles for 6-9 months and felt better. She delivered a baby and noticed some pain after 2 yrs and underwent PT again. April 2021 bent down to dry her legs and felt a pop. She saw PCP took muscle relaxants and did the previous exercises and felt better. July 4th she was sitting on the floor playing with her daughter but noticed back pain, she mentions the exercises her making the pain not get worse. But she thinks her core is weak and wants to get stronger. She mentions of her tight hamstring which is contributing to the problem. She was sent to PT for further management. .         Type of problem:  Lumbar.    This is a chronic condition.  Condition occurred with:  Bending.  Where condition occurred: at home.  Patient reports pain:  Lower lumbar spine, lumbar spine left and lumbar spine right.  Pain is described as shooting and is intermittent.  Pain radiates to:  Gluteals left, thigh left, knee left and lower leg left. Pain is worse in the P.M..  Since onset symptoms are unchanged.  Associated symptoms:  Loss of motion/stiffness and loss of strength. Symptoms are exacerbated by sitting, standing, walking, bending, lifting and carrying  and relieved by NSAID's and muscle relaxants.    Previous treatment includes physical therapy. There was moderate improvement following previous treatment.  Barriers include:  None as reported by patient.    Patient Health History  Jessica Dunne being seen for low back.     Date of Onset: April 2021.   Problem occurred: bending activities   Pain is reported as 7/10 on pain scale.  General health as reported by patient is excellent.  Pertinent medical history includes: thyroid problems.   Red flags:  None  as reported by patient.   Other medical allergies details: Amoxycillin, doxycycline.   Surgeries include:  Other. Other surgery history details: thyroid, appendix, c- section.    Current medications:  Thyroid medication. Other medications details: multivitamin, vit D3.    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   Primary job tasks include:  Computer work, driving, lifting/carrying, prolonged sitting, prolonged standing, pushing/pulling and repetitive tasks.                                    Objective:  System         Lumbar/SI Evaluation  ROM:    AROM Lumbar:   Flexion:            Till knee pain level 6/10- hamstring tightness present  Ext:                    WFL   Side Bend:        Left:  WFL    Right:  WFL  Rotation:           Left:  WFL    Right:  WFL  Side Glide:        Left:     Right:           Lumbar Myotomes:  normal            Lumbar DTR's:  normal      Cord Signs:  normal    Lumbar Dermtomes:  normal                Neural Tension/Mobility:  Lumbar:  Normal (increased tightness left gluteal area and hamstring while performing slump test)        Lumbar Palpation:  Palpation (lumbar): iscial tuberosity left side.  Tenderness present at Left:    Erector Spinae; Piriformis and PSIS          SI joint/Sacrum:            Sacral conclusion left:  Locked                                                 Mauro Lumbar Evaluation    Posture:  Sitting: good  Standing: good  Lordosis: WNL  Lateral Shift: no  Correction of Posture: better    Movement Loss:  Flexion (Flex): mod and pain        Test Movements:  FIS: During: increases  After: no worse    Repeat FIS: During: increases  After: worse    EIS: During: abolishes  After: better    Repeat EIS: During: abolishes  After: better              Conclusion: posture and dysfunction  Principle of Treatment:  Posture Correction: sitting with good posture- alignment educated  Flexion: avoided  Extension: encouraged                                            ROS    Assessment/Plan:    Patient is a 41 year old female with lumbar complaints.    Patient has the following significant findings with corresponding treatment plan.                Diagnosis 1:  Left lumbar radiculopathy  Pain -  hot/cold therapy, US, electric stimulation, manual therapy, STS, self management, education, directional preference exercise and home program  Decreased ROM/flexibility - manual therapy, therapeutic exercise, therapeutic activity and home program  Decreased strength - therapeutic exercise, therapeutic activities and home program  Inflammation - cold therapy, US, electric stimulation and self management/home program  Impaired muscle performance - neuro re-education and home program  Decreased function - therapeutic activities and home program    Therapy Evaluation Codes:   1) History comprised of:   Personal factors that impact the plan of care:      Past/current experiences and Time since onset of symptoms.    Comorbidity factors that impact the plan of care are:      check HPI.     Medications impacting care: check HPI.  2) Examination of Body Systems comprised of:   Body structures and functions that impact the plan of care:      Lumbar spine.   Activity limitations that impact the plan of care are:      Bending, Dressing, Lifting, Reading/Computer work, Sitting, Standing and Walking.  3) Clinical presentation characteristics are:   Stable/Uncomplicated.  4) Decision-Making    Low complexity using standardized patient assessment instrument and/or measureable assessment of functional outcome.  Cumulative Therapy Evaluation is: Low complexity.    Previous and current functional limitations:  (See Goal Flow Sheet for this information)    Short term and Long term goals: (See Goal Flow Sheet for this information)     Communication ability:  Patient appears to be able to clearly communicate and understand verbal and written communication and follow directions correctly.  Treatment  Explanation - The following has been discussed with the patient:   RX ordered/plan of care  Anticipated outcomes  Possible risks and side effects  This patient would benefit from PT intervention to resume normal activities.   Rehab potential is good.    Frequency:  1 X week, once daily  Duration:  for 6 weeks  Discharge Plan:  Achieve all LTG.  Independent in home treatment program.  Reach maximal therapeutic benefit.    Please refer to the daily flowsheet for treatment today, total treatment time and time spent performing 1:1 timed codes.

## 2021-07-28 ENCOUNTER — THERAPY VISIT (OUTPATIENT)
Dept: PHYSICAL THERAPY | Facility: CLINIC | Age: 41
End: 2021-07-28
Payer: COMMERCIAL

## 2021-07-28 DIAGNOSIS — M54.16 LUMBAR RADICULOPATHY: ICD-10-CM

## 2021-07-28 PROCEDURE — 97035 APP MDLTY 1+ULTRASOUND EA 15: CPT | Mod: GP | Performed by: PHYSICAL THERAPIST

## 2021-07-28 PROCEDURE — 97112 NEUROMUSCULAR REEDUCATION: CPT | Mod: GP | Performed by: PHYSICAL THERAPIST

## 2021-07-28 PROCEDURE — 97140 MANUAL THERAPY 1/> REGIONS: CPT | Mod: GP | Performed by: PHYSICAL THERAPIST

## 2021-07-28 PROCEDURE — 97110 THERAPEUTIC EXERCISES: CPT | Mod: GP | Performed by: PHYSICAL THERAPIST

## 2021-08-04 ENCOUNTER — THERAPY VISIT (OUTPATIENT)
Dept: PHYSICAL THERAPY | Facility: CLINIC | Age: 41
End: 2021-08-04
Payer: COMMERCIAL

## 2021-08-04 DIAGNOSIS — M54.16 LUMBAR RADICULOPATHY: ICD-10-CM

## 2021-08-04 PROCEDURE — 97112 NEUROMUSCULAR REEDUCATION: CPT | Mod: GP | Performed by: PHYSICAL THERAPIST

## 2021-08-04 PROCEDURE — 97110 THERAPEUTIC EXERCISES: CPT | Mod: GP | Performed by: PHYSICAL THERAPIST

## 2021-08-18 ENCOUNTER — THERAPY VISIT (OUTPATIENT)
Dept: PHYSICAL THERAPY | Facility: CLINIC | Age: 41
End: 2021-08-18
Payer: COMMERCIAL

## 2021-08-18 DIAGNOSIS — M54.16 LUMBAR RADICULOPATHY: ICD-10-CM

## 2021-08-18 PROCEDURE — 97112 NEUROMUSCULAR REEDUCATION: CPT | Mod: GP | Performed by: PHYSICAL THERAPIST

## 2021-08-18 PROCEDURE — 97110 THERAPEUTIC EXERCISES: CPT | Mod: GP | Performed by: PHYSICAL THERAPIST

## 2021-08-18 NOTE — PROGRESS NOTES
Subjective:  HPI  Physical Exam                    Objective:  System    Physical Exam    General     ROS    Assessment/Plan:    PROGRESS  REPORT    Progress reporting period is from 7/21/21 to 8/18/21.       SUBJECTIVE  Subjective changes noted by patient:  Subjective: Decreased pain but has shifted to left side and still feels it in her left ankle and foot. Overall improvement with pain but still has symptoms with ADL.     Current pain level is  Current Pain level: 3/10.     Previous pain level was    .   Changes in function:  Yes (See Goal flowsheet attached for changes in current functional level)  Adverse reaction to treatment or activity: activity - ADL- bending    OBJECTIVE  Changes noted in objective findings:  Yes,   Objective: L/S AROM is WFL. Radicular symptoms present. Advsied patient to stick with extension exercises and start modified plank. Advised to reach out to her provider for further evaluation/ investigation.      ASSESSMENT/PLAN  Updated problem list and treatment plan: Diagnosis 1:  LBP radiculopathy    STG/LTGs have been met or progress has been made towards goals:  Yes,   Assessment of Progress: The patient's progress has plateaued.  Self Management Plans:  Patient has been instructed in a home treatment program.  Patient  has been instructed in self management of symptoms.  I have re-evaluated this patient and find that the nature, scope, duration and intensity of the therapy is appropriate for the medical condition of the patient.  Jessica continues to require the following intervention to meet STG and LTG's:  PT    Recommendations:  This patient would benefit from further evaluation.    Please refer to the daily flowsheet for treatment today, total treatment time and time spent performing 1:1 timed codes.

## 2021-12-07 PROBLEM — M54.16 LUMBAR RADICULOPATHY: Status: RESOLVED | Noted: 2021-07-21 | Resolved: 2021-12-07

## 2021-12-07 NOTE — PROGRESS NOTES
Discharge Note    Progress reporting period is from last progress note on   to Aug 4, 2021.    Jessica failed to follow up and current status is unknown.  Please see information below for last relevant information on current status.  Patient seen for 3 visits.    SUBJECTIVE  Subjective changes noted by patient:  more pain during last week, not sure why  .  Current pain level is 4/10.     Previous pain level was   .   Changes in function:  Yes (See Goal flowsheet attached for changes in current functional level)  Adverse reaction to treatment or activity: None    OBJECTIVE  Changes noted in objective findings: Reviewed exercises, DC few exercises. focused on abdominal strengthening exercise     ASSESSMENT/PLAN  Diagnosis: Left lumbar radiculopathy   Updated problem list and treatment plan:     STG/LTGs have been met or progress has been made towards goals:  Yes, please see goal flowsheet for most current information  Assessment of Progress: current status is unknown.    Last current status: Pt is progressing slower than anticipated   Self Management Plans:  HEP  I have re-evaluated this patient and find that the nature, scope, duration and intensity of the therapy is appropriate for the medical condition of the patient.  Jessica continues to require the following intervention to meet STG and LTG's:  HEP.    Recommendations:  Discharge with current home program.  Patient to follow up with MD as needed.    Please refer to the daily flowsheet for treatment today, total treatment time and time spent performing 1:1 timed codes.

## 2022-03-31 ENCOUNTER — APPOINTMENT (OUTPATIENT)
Dept: URBAN - METROPOLITAN AREA CLINIC 254 | Age: 42
Setting detail: DERMATOLOGY
End: 2022-04-01

## 2022-03-31 VITALS — HEIGHT: 69 IN | WEIGHT: 143 LBS

## 2022-03-31 DIAGNOSIS — Z71.89 OTHER SPECIFIED COUNSELING: ICD-10-CM

## 2022-03-31 DIAGNOSIS — L81.4 OTHER MELANIN HYPERPIGMENTATION: ICD-10-CM

## 2022-03-31 DIAGNOSIS — D22 MELANOCYTIC NEVI: ICD-10-CM

## 2022-03-31 DIAGNOSIS — D18.0 HEMANGIOMA: ICD-10-CM

## 2022-03-31 DIAGNOSIS — L73.8 OTHER SPECIFIED FOLLICULAR DISORDERS: ICD-10-CM

## 2022-03-31 DIAGNOSIS — L82.1 OTHER SEBORRHEIC KERATOSIS: ICD-10-CM

## 2022-03-31 DIAGNOSIS — L72.8 OTHER FOLLICULAR CYSTS OF THE SKIN AND SUBCUTANEOUS TISSUE: ICD-10-CM

## 2022-03-31 PROBLEM — D22.72 MELANOCYTIC NEVI OF LEFT LOWER LIMB, INCLUDING HIP: Status: ACTIVE | Noted: 2022-03-31

## 2022-03-31 PROBLEM — D18.01 HEMANGIOMA OF SKIN AND SUBCUTANEOUS TISSUE: Status: ACTIVE | Noted: 2022-03-31

## 2022-03-31 PROBLEM — D23.72 OTHER BENIGN NEOPLASM OF SKIN OF LEFT LOWER LIMB, INCLUDING HIP: Status: ACTIVE | Noted: 2022-03-31

## 2022-03-31 PROBLEM — D22.62 MELANOCYTIC NEVI OF LEFT UPPER LIMB, INCLUDING SHOULDER: Status: ACTIVE | Noted: 2022-03-31

## 2022-03-31 PROBLEM — D22.61 MELANOCYTIC NEVI OF RIGHT UPPER LIMB, INCLUDING SHOULDER: Status: ACTIVE | Noted: 2022-03-31

## 2022-03-31 PROBLEM — D22.5 MELANOCYTIC NEVI OF TRUNK: Status: ACTIVE | Noted: 2022-03-31

## 2022-03-31 PROBLEM — D22.39 MELANOCYTIC NEVI OF OTHER PARTS OF FACE: Status: ACTIVE | Noted: 2022-03-31

## 2022-03-31 PROCEDURE — OTHER COUNSELING: OTHER

## 2022-03-31 PROCEDURE — OTHER MONITORING: OTHER

## 2022-03-31 PROCEDURE — OTHER SUNSCREEN RECOMMENDATIONS: OTHER

## 2022-03-31 PROCEDURE — 99213 OFFICE O/P EST LOW 20 MIN: CPT

## 2022-03-31 PROCEDURE — OTHER MIPS QUALITY: OTHER

## 2022-03-31 ASSESSMENT — LOCATION SIMPLE DESCRIPTION DERM
LOCATION SIMPLE: LEFT UPPER ARM
LOCATION SIMPLE: LEFT POPLITEAL SKIN
LOCATION SIMPLE: RIGHT AXILLARY VAULT
LOCATION SIMPLE: LEFT UPPER BACK
LOCATION SIMPLE: CHEST
LOCATION SIMPLE: LEFT SHOULDER
LOCATION SIMPLE: UPPER BACK
LOCATION SIMPLE: RIGHT UPPER BACK
LOCATION SIMPLE: NOSE
LOCATION SIMPLE: LEFT FOREHEAD
LOCATION SIMPLE: RIGHT POSTERIOR UPPER ARM
LOCATION SIMPLE: ABDOMEN

## 2022-03-31 ASSESSMENT — LOCATION ZONE DERM
LOCATION ZONE: NOSE
LOCATION ZONE: AXILLAE
LOCATION ZONE: TRUNK
LOCATION ZONE: LEG
LOCATION ZONE: ARM
LOCATION ZONE: FACE

## 2022-03-31 ASSESSMENT — LOCATION DETAILED DESCRIPTION DERM
LOCATION DETAILED: SUPERIOR THORACIC SPINE
LOCATION DETAILED: LEFT FOREHEAD
LOCATION DETAILED: LEFT POPLITEAL SKIN
LOCATION DETAILED: RIGHT MEDIAL SUPERIOR CHEST
LOCATION DETAILED: UPPER STERNUM
LOCATION DETAILED: LEFT ANTERIOR SHOULDER
LOCATION DETAILED: NASAL DORSUM
LOCATION DETAILED: RIGHT PROXIMAL POSTERIOR UPPER ARM
LOCATION DETAILED: LEFT SUPERIOR MEDIAL UPPER BACK
LOCATION DETAILED: RIGHT MEDIAL UPPER BACK
LOCATION DETAILED: LEFT ANTERIOR PROXIMAL UPPER ARM
LOCATION DETAILED: RIGHT AXILLARY VAULT
LOCATION DETAILED: LEFT LATERAL SUPERIOR CHEST
LOCATION DETAILED: EPIGASTRIC SKIN

## 2022-03-31 NOTE — HPI: FULL BODY SKIN EXAMINATION
How Severe Are Your Spot(S)?: mild
What Is The Reason For Today's Visit?: Full Body Skin Examination
What Is The Reason For Today's Visit? (Being Monitored For X): concerning skin lesions on an annual basis
Additional History: Lesion on frontal scalp is growing and tender with combing hair.

## 2023-03-28 ENCOUNTER — APPOINTMENT (OUTPATIENT)
Dept: URBAN - METROPOLITAN AREA CLINIC 254 | Age: 43
Setting detail: DERMATOLOGY
End: 2023-03-30

## 2023-03-28 VITALS — WEIGHT: 145 LBS | HEIGHT: 68 IN

## 2023-03-28 DIAGNOSIS — D22 MELANOCYTIC NEVI: ICD-10-CM

## 2023-03-28 DIAGNOSIS — D18.0 HEMANGIOMA: ICD-10-CM

## 2023-03-28 DIAGNOSIS — L82.1 OTHER SEBORRHEIC KERATOSIS: ICD-10-CM

## 2023-03-28 DIAGNOSIS — L81.4 OTHER MELANIN HYPERPIGMENTATION: ICD-10-CM

## 2023-03-28 DIAGNOSIS — Z71.89 OTHER SPECIFIED COUNSELING: ICD-10-CM

## 2023-03-28 PROBLEM — D18.01 HEMANGIOMA OF SKIN AND SUBCUTANEOUS TISSUE: Status: ACTIVE | Noted: 2023-03-28

## 2023-03-28 PROBLEM — D22.5 MELANOCYTIC NEVI OF TRUNK: Status: ACTIVE | Noted: 2023-03-28

## 2023-03-28 PROBLEM — D22.61 MELANOCYTIC NEVI OF RIGHT UPPER LIMB, INCLUDING SHOULDER: Status: ACTIVE | Noted: 2023-03-28

## 2023-03-28 PROBLEM — D23.72 OTHER BENIGN NEOPLASM OF SKIN OF LEFT LOWER LIMB, INCLUDING HIP: Status: ACTIVE | Noted: 2023-03-28

## 2023-03-28 PROCEDURE — 99213 OFFICE O/P EST LOW 20 MIN: CPT

## 2023-03-28 PROCEDURE — OTHER MIPS QUALITY: OTHER

## 2023-03-28 PROCEDURE — OTHER OBSERVATION: OTHER

## 2023-03-28 PROCEDURE — OTHER COUNSELING: OTHER

## 2023-03-28 ASSESSMENT — LOCATION DETAILED DESCRIPTION DERM
LOCATION DETAILED: RIGHT POSTERIOR SHOULDER
LOCATION DETAILED: LEFT LATERAL FOREHEAD
LOCATION DETAILED: RIGHT SUPERIOR MEDIAL LOWER BACK
LOCATION DETAILED: RIGHT LATERAL FOREHEAD
LOCATION DETAILED: RIGHT FOREHEAD
LOCATION DETAILED: RIGHT ANTERIOR PROXIMAL UPPER ARM
LOCATION DETAILED: RIGHT MID-UPPER BACK
LOCATION DETAILED: INFERIOR THORACIC SPINE
LOCATION DETAILED: LEFT INFERIOR UPPER BACK
LOCATION DETAILED: RIGHT INFERIOR UPPER BACK

## 2023-03-28 ASSESSMENT — LOCATION SIMPLE DESCRIPTION DERM
LOCATION SIMPLE: RIGHT UPPER ARM
LOCATION SIMPLE: LEFT FOREHEAD
LOCATION SIMPLE: RIGHT FOREHEAD
LOCATION SIMPLE: LEFT UPPER BACK
LOCATION SIMPLE: RIGHT SHOULDER
LOCATION SIMPLE: RIGHT LOWER BACK
LOCATION SIMPLE: UPPER BACK
LOCATION SIMPLE: RIGHT UPPER BACK

## 2023-03-28 ASSESSMENT — LOCATION ZONE DERM
LOCATION ZONE: FACE
LOCATION ZONE: ARM
LOCATION ZONE: TRUNK

## 2023-04-19 ENCOUNTER — TRANSCRIBE ORDERS (OUTPATIENT)
Dept: OTHER | Age: 43
End: 2023-04-19

## 2023-04-19 DIAGNOSIS — M77.11 LATERAL EPICONDYLITIS OF RIGHT ELBOW: Primary | ICD-10-CM

## 2023-04-26 ENCOUNTER — THERAPY VISIT (OUTPATIENT)
Dept: PHYSICAL THERAPY | Facility: CLINIC | Age: 43
End: 2023-04-26
Attending: NURSE PRACTITIONER
Payer: COMMERCIAL

## 2023-04-26 DIAGNOSIS — M77.11 LATERAL EPICONDYLITIS OF RIGHT ELBOW: ICD-10-CM

## 2023-04-26 PROCEDURE — 97112 NEUROMUSCULAR REEDUCATION: CPT | Mod: GP | Performed by: PHYSICAL THERAPIST

## 2023-04-26 PROCEDURE — 97161 PT EVAL LOW COMPLEX 20 MIN: CPT | Mod: GP | Performed by: PHYSICAL THERAPIST

## 2023-04-26 NOTE — PROGRESS NOTES
Physical Therapy Initial Evaluation  Subjective:  The history is provided by the patient. No  was used.   Therapist Generated HPI Evaluation  Problem details: I have right elbow pain possibly from over extending the elbow.  I have a tendency to keep the elbow across my body to help protect it.  Lifting heavy objects, pouring milk and  squeezing a clip board, holding a book while reading all increase in pain.  I wake up at night due to the right elbow 3x/ night .  Right handed.  .         Type of problem:  Right elbow.    This is a chronic condition.  Condition occurred with:  Insidious onset.  Where condition occurred: for unknown reasons.  Patient reports pain:  Lateral and lateral epicondyle.  Pain is described as stabbing and sharp and is constant.  Pain radiates to:  Elbow. Pain is the same all the time.  Since onset symptoms are gradually worsening.  Associated with: warmth  Symptoms are exacerbated by carrying, lifting and certain positions (moving)  and relieved by rest and ice (arm across body).  Imaging testing: none.  Past treatment: for the elbow. There was none improvement following previous treatment.  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     Problem began: 2023 (MD orders).   Problem occurred: unknown    Pain is reported as 5/10 (generally 3/10) on pain scale.  General health as reported by patient is good.  Pertinent medical history includes: thyroid problems.   Red flags:  Pain at rest/night (right elbow).   Other medical allergies details: amixycillin, doxycycline, quinolone antibotics.    Other surgery history details: thyroid lobectomy, appendectomy, , tonsillectomy, wisdom teeth out.    Current medications:  Thyroid medication.    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   Primary job  tasks include:  Computer work, driving, pushing/pulling, prolonged sitting and lifting/carrying.                                    Objective:  Standing Alignment:    Cervical/Thoracic:  Forward head (sitting posture, good)  Shoulder/UE:  Scapular winging R and scapular winging L (internal rotation of bilateral shoulders)                  Flexibility/Screens:     Upper Extremity:        Decreased right upper extremity flexibility present at:  Wrist Extensors                                ROM:  AROM:      Flexion Elbow:  Left:WFL   Right:WFL  Extension Elbow:  Left: WFL    Right: WFL  Flexion Wrist:  Left: WFL    Right: WFL  Extension Wrist:  Left: WFL    Right:  Slight limitation   Supination Elbow/Wrist:  Left: WFLRight: slight limitation   Pronation Elbow/Wrist:  Left: WFL    Right: WFL  Radial Deviation:  Left: WFL   Right: WFL  Ulnar Deviation:  Right: WFL    Left: WFL    Pain: cervical and shoulder ROM generally WFL     Strength:    Flexion Elbow:  Left: 5/5 Pain:    Right: 5/5 Pain:  Extension Elbow: Left: 5/5 Pain:    Right: 5/5 Pain:  Flexion Wrist:  Left: 5/5 Pain:    Right: 5/5 Pain:  Extension Wrist: Left: 5/5 Pain:  Right: 4+/5  Pain:+  Supination Elbow/Wrist: Left: 5/5 Pain:    Right: 4+/5  Pain:+/-  Pronation Elbow/Wrist: Left: 5/5 Pain:        Right: 5/5 Pain:  Radial Deviation:  Left: 5/5 Pain:    Right: 5/5 Pain:  Ulnar Deviation: Left: 5/5 Pain:    Right: 5/5 Pain::  Dominance: Right   Left: 40#      Right: 45#  Ligament Testing:  Valgus 0: Right: Neg      Special Testing:    Left wrist/elbow negative for the following special tests:   Lateral Epicondylitis or Medial Epicondylitis  Right wrist/elbow positive for the following special tests: Lateral EpicondylitisRight wrist/elbow negative for the following special tests: Medial Epicondylitis  Palpation:      Right wrist/elbow tenderness present at:Lateral Epicondyle; Wrist Extensors and Radial Head  Mobility:  not assessed                                           General     ROS    Assessment/Plan:    Patient is a 43 year old female with right side elbow complaints.    Patient has the following significant findings with corresponding treatment plan.                Diagnosis 1:  Right lateral epicondylitis  Pain -  manual therapy, splint/taping/bracing/orthotics, self management, education and home program  Decreased strength - therapeutic exercise, therapeutic activities and home program  Impaired muscle performance - neuro re-education and home program  Decreased function - therapeutic activities and home program  Impaired posture - neuro re-education, therapeutic activities and home program  Evaluation ongoing     Therapy Evaluation Codes:   Cumulative Therapy Evaluation is: Low complexity.    Previous and current functional limitations:  (See Goal Flow Sheet for this information)    Short term and Long term goals: (See Goal Flow Sheet for this information)     Communication ability:  Patient appears to be able to clearly communicate and understand verbal and written communication and follow directions correctly.  Treatment Explanation - The following has been discussed with the patient:   RX ordered/plan of care  This patient would benefit from PT intervention to resume normal activities.   Rehab potential is good.    Frequency:  1 X week, once daily  Duration:  for 8 weeks  Discharge Plan:  Achieve all LTG.  Independent in home treatment program.    Please refer to the daily flowsheet for treatment today, total treatment time and time spent performing 1:1 timed codes.

## 2023-05-03 ENCOUNTER — THERAPY VISIT (OUTPATIENT)
Dept: PHYSICAL THERAPY | Facility: CLINIC | Age: 43
End: 2023-05-03
Attending: NURSE PRACTITIONER
Payer: COMMERCIAL

## 2023-05-03 DIAGNOSIS — M77.11 LATERAL EPICONDYLITIS OF RIGHT ELBOW: Primary | ICD-10-CM

## 2023-05-03 PROCEDURE — 97140 MANUAL THERAPY 1/> REGIONS: CPT | Mod: GP | Performed by: PHYSICAL THERAPIST

## 2023-05-03 PROCEDURE — 97110 THERAPEUTIC EXERCISES: CPT | Mod: GP | Performed by: PHYSICAL THERAPIST

## 2023-05-17 ENCOUNTER — THERAPY VISIT (OUTPATIENT)
Dept: PHYSICAL THERAPY | Facility: CLINIC | Age: 43
End: 2023-05-17
Payer: COMMERCIAL

## 2023-05-17 DIAGNOSIS — M77.11 LATERAL EPICONDYLITIS OF RIGHT ELBOW: Primary | ICD-10-CM

## 2023-05-17 PROCEDURE — 97140 MANUAL THERAPY 1/> REGIONS: CPT | Mod: GP | Performed by: PHYSICAL THERAPIST

## 2023-05-17 PROCEDURE — 97112 NEUROMUSCULAR REEDUCATION: CPT | Mod: GP | Performed by: PHYSICAL THERAPIST

## 2023-05-17 PROCEDURE — 97110 THERAPEUTIC EXERCISES: CPT | Mod: GP | Performed by: PHYSICAL THERAPIST

## 2023-05-31 ENCOUNTER — THERAPY VISIT (OUTPATIENT)
Dept: PHYSICAL THERAPY | Facility: CLINIC | Age: 43
End: 2023-05-31
Payer: COMMERCIAL

## 2023-05-31 DIAGNOSIS — M77.11 LATERAL EPICONDYLITIS OF RIGHT ELBOW: Primary | ICD-10-CM

## 2023-05-31 PROCEDURE — 97110 THERAPEUTIC EXERCISES: CPT | Mod: GP | Performed by: PHYSICAL THERAPIST

## 2023-05-31 PROCEDURE — 97140 MANUAL THERAPY 1/> REGIONS: CPT | Mod: GP | Performed by: PHYSICAL THERAPIST

## 2023-06-14 ENCOUNTER — THERAPY VISIT (OUTPATIENT)
Dept: PHYSICAL THERAPY | Facility: CLINIC | Age: 43
End: 2023-06-14
Payer: COMMERCIAL

## 2023-06-14 DIAGNOSIS — M77.11 LATERAL EPICONDYLITIS OF RIGHT ELBOW: Primary | ICD-10-CM

## 2023-06-14 PROCEDURE — 97110 THERAPEUTIC EXERCISES: CPT | Mod: GP | Performed by: PHYSICAL THERAPIST

## 2023-06-14 PROCEDURE — 97140 MANUAL THERAPY 1/> REGIONS: CPT | Mod: GP | Performed by: PHYSICAL THERAPIST

## 2023-06-28 ENCOUNTER — THERAPY VISIT (OUTPATIENT)
Dept: PHYSICAL THERAPY | Facility: CLINIC | Age: 43
End: 2023-06-28
Payer: COMMERCIAL

## 2023-06-28 DIAGNOSIS — M77.11 LATERAL EPICONDYLITIS OF RIGHT ELBOW: Primary | ICD-10-CM

## 2023-06-28 PROCEDURE — 97140 MANUAL THERAPY 1/> REGIONS: CPT | Mod: GP | Performed by: PHYSICAL THERAPIST

## 2023-06-28 PROCEDURE — 97110 THERAPEUTIC EXERCISES: CPT | Mod: GP | Performed by: PHYSICAL THERAPIST

## 2023-06-30 NOTE — PROGRESS NOTES
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   Progress Note/Certification   Onset of illness/injury or Date of Surgery 04/18/23  (MD orders)   Therapy Frequency 1x/month   Predicted Duration 2 additional months   Progress Note Completed Date 06/28/23   PT Goal 1   Goal Identifier R elbow   Goal Description lifting/carrying: Patient will be able to lift an item to counter height weighing 6-8# with PL 0-2/10   Rationale to maximize safety and independence with performance of ADLs and functional tasks  (gorcery shopping, use of )   Goal Progress Currently able to lift 5# with 2-4/10 PL with R UE   Target Date 08/28/23   Subjective Report   Subjective Report Overall, this past week the pain levels have been lower. 0-2/10 PL during the day, 3-4/10 in the AM. Pain continues to worst in the AM upon waking, questioning the position she assumes while sleeping contibutes to this, Patient also relating that she is aware of having overall increased muscle tension due to stress from daily life. Lifting something that weighs ~5# increases R elbow area pain to 2-4/10.   Objective Measures   Objective Measures Objective Measure 1;Objective Measure 2;Objective Measure 3   Objective Measure 1   Objective Measure R shoulder, elbow and wrist AROM remain WNL, R elbow visibly hyperextends. R pect, biceps, and subscap tightness ongoing.   Objective Measure 2   Objective Measure strength   Details R wrist extension/flexion strength remain 5/5, +pn   Objective Measure 3   Objective Measure Tenderness ongoing in both medial and lateral  aspects of R elbow.   Details Tenderness along lat border of R scapula(subscap).   Treatment Interventions (PT)   Interventions Therapeutic  "Procedure/Exercise;Manual Therapy;Neuromuscular Re-education   Therapeutic Procedure/Exercise   Therapeutic Procedures: strength, endurance, ROM, flexibillity minutes (59387) 17   Therapeutic Procedures Ther Proc 2;Ther Proc 3;Ther Proc 4   Ther Proc 1 UBE x8' F/B at 1.5 work load   Ther Proc 2 unilateral R pect stretching-instructed to be used for R biceps stetchign as well   Ther Proc 2 - Details 3x30\"   Ther Proc 3 L SL: R UE ER AG x20, HEP   Ther Proc 3 - Details Trial of seated cervical retraction with pt OP 2x10 and seated thoracic extension 2x10, both felt good, clinic only   Ther Proc 4 supine and L SL: manual stretching of R elbow/forearm, R shoulder/pect areas   PTRx Ther Proc 1 Scapular Retraction/Depression   PTRx Ther Proc 1 - Details 10x10\" mc to increase depression   PTRx Ther Proc 2 Forearm Passive Range of Motion Extensor Stretch   PTRx Ther Proc 2 - Details see above   PTRx Ther Proc 3 Forearm Passive Range of Motion Flexor Stretch   PTRx Ther Proc 3 - Details see above   PTRx Ther Proc 4 Shoulder Theraband Rows   PTRx Ther Proc 4 - Details GTB x15 HEP   PTRx Ther Proc 5 Lat Pulldown   PTRx Ther Proc 5 - Details GTB x15 HEP   PTRx Ther Proc 6 Wrist Strengthening Eccentric Extension with Dumb [place]iff   PTRx Ther Proc 6 - Details increased wt 1.5 # 3x15, instructed pt to continue performing   Skilled Intervention strengthening of UE's and scapular stabilizers, increase muscle flexibility, reduce resting tension   Patient Response/Progress improved resting muscle tension   Ther Proc 4 - Details 8'   Therapeutic Activity   PTRx Ther Act 1 TENNIS ELBOW PREVENTION   PTRx Ther Act 1 - Details No Notes   PTRx Ther Act 2 Sitting Posture at Computer   PTRx Ther Act 2 - Details No Notes   Manual Therapy   Manual Therapy: Mobilization, MFR, MLD, friction massage minutes (79269) 23   Manual Therapy 1 R scapular mobs/periscapular STM   Manual Therapy 1 - Details L SL: R scap lateral tilt and rotation mobs followed " by periscapular STM focusing on lateral border(subscap) and anterior(pect)   Manual Therapy 2 STM   Manual Therapy 2 - Details supine: R A/P shoulder, biceps, and medial/lateral proximal forearm   Skilled Intervention reduce pain, increase flexibility, promote relaxation   Patient Response/Progress reduced pain, increased flexibility, reduced resting muscle tension   Education   Learner/Method Patient;Listening;Reading;Demonstration;Pictures/Video;No Barriers to Learning   Plan   Home program see PTRx   Updates to plan of care added R biceps stetching   Plan for next session Progress as able with stretchign and strengthening   Total Session Time   Timed Code Treatment Minutes 40   Total Treatment Time (sum of timed and untimed services) 40       PLAN  Continue therapy per current plan of care which is 1x/month for 2 additional months(2 visits remain on current plan).    Beginning/End Dates of Progress Note Reporting Period:  4/28/2023 to 06/28/2023   Patient has completed 6 PT visits    Referring Provider:  Yoana Valdovinos

## 2024-03-26 PROBLEM — M77.11 LATERAL EPICONDYLITIS OF RIGHT ELBOW: Status: RESOLVED | Noted: 2023-04-26 | Resolved: 2024-03-26

## 2024-03-26 NOTE — PROGRESS NOTES
Patient did not return for further treatment and no additional progress was noted.  Please refer to the progress note and goal flowsheet completed on 6/28/2024  for discharge information.